# Patient Record
Sex: FEMALE | Race: WHITE
[De-identification: names, ages, dates, MRNs, and addresses within clinical notes are randomized per-mention and may not be internally consistent; named-entity substitution may affect disease eponyms.]

---

## 2018-02-10 NOTE — RAD
Indication: Chest pain.



Comparison: No relevant prior exams available on the [facility] [place], [place] PACS for comparison.



Technique: Upright AP 1235 hours



Report: Clear lungs and pleural spaces. Negative for pneumothorax. The heart, pulmonary

vasculature, and mediastinal contours are unremarkable. Unremarkable osseous structures

and soft tissue contours.



IMPRESSION: No evidence for acute intrathoracic disease.

## 2018-02-28 NOTE — ED
HPI Chest Pain





- HPI Summary


HPI Summary: 





Patient is an otherwise healthy 47-year-old female who presents to the ED with 

chief complaint of chest pain 30 minutes.  She endorses resting while first 

experiencing the chest pain.  She endorses significant anxiety over the 

episode.  Denies any cardiac history.  Denies family history.  She takes no 

medications.  She denies any shortness of breath, coughing, upper respiratory 

illness recently or other contributing factors.  Symptoms are aggravated by 

nothing and alleviated by nothing.  She is resting comfortably on arrival.  She 

does not have a history of anxiety.  She states she would just like to get "

this checked out".  She denies any recent travel, calf pain, OCP use, or 

smoking history.  Has never had a blood clot.  Pain is most notably located 

over the left anterior and does not radiate into the jaw or the arm.





- History of Current Complaint


Chief Complaint: EDChestPainROMI


Time Seen by Provider: 02/10/18 12:09


Hx Obtained From: Patient


Onset/Duration: Started Hours Ago


Timing: Constant


Initial Severity: Moderate


Current Severity: Moderate


Pain Intensity: 3


Pain Scale Used: 0-10 Numeric


Chest Pain Location: Discrete at:, Left Anterior


Aggravating Factor(s): Nothing


Alleviating Factor(s): Nothing


Associated Signs and Symptoms: Positive: Chest Pain





- Risk Factors


Pulmonary Embolism Risk Factors: Negative


TAD Risk Factors: Negative





- Allergy/Home Medications


Allergies/Adverse Reactions: 


 Allergies











Allergy/AdvReac Type Severity Reaction Status Date / Time


 


MS Pregabalin [From Lyrica] Allergy  Rash Verified 09/29/16 15:59














PMH/Surg Hx/FS Hx/Imm Hx


Previously Healthy: Yes





- Surgical History


Surgery Procedure, Year, and Place: tubal ligation





- Immunization History


Hx Pertussis Vaccination: No


Immunizations Up to Date: Unable to Obtain/Confirm


Infectious Disease History: No


Infectious Disease History: 


   Denies: History Other Infectious Disease, Traveled Outside the [place] in Last 30 

Days





- Family History


Known Family History: Positive: Blood Disorder - no immune disorders in FHX





- Social History


Occupation: Employed Full-time


Lives: With Family


Alcohol Use: Weekly


Alcohol Amount: 3 x's per week  approx


Hx Substance Use: Yes


Substance Use Type: Reports: None


Smoking Status (MU): Never Smoked Tobacco





Review of Systems


Constitutional: Negative


Negative: Fever, Chills, Fatigue, Skin Diaphoresis


Eyes: Negative


Positive: Chest Pain


Respiratory: Negative


Gastrointestinal: Negative


Positive: no symptoms reported, see HPI


Musculoskeletal: Negative


Neurological: Negative


Positive: Anxious


All Other Systems Reviewed And Are Negative: Yes





Physical Exam


Triage Information Reviewed: Yes


Vital Signs On Initial Exam: 


 Initial Vitals











Temp Pulse Resp BP Pulse Ox


 


 98.6 F   98   18   154/93   100 


 


 02/10/18 11:59  02/10/18 11:59  02/10/18 11:59  02/10/18 11:59  02/10/18 11:59











Vital Signs Reviewed: Yes


Appearance: Positive: Well-Appearing, Well-Nourished


Skin: Positive: Warm, Skin Color Reflects Adequate Perfusion


Head/Face: Positive: Normal Head/Face Inspection


Eyes: Positive: EOMI, DALIA, Conjunctiva Clear


Neck: Positive: Supple, No Lymphadenopathy


Respiratory/Lung Sounds: Positive: Clear to Auscultation, Breath Sounds Present


Cardiovascular: Positive: RRR, Pulses are Symmetrical in both Upper and Lower 

Extremities


Musculoskeletal: Positive: Normal, Strength/ROM Intact


Neurological: Positive: Speech Normal


Psychiatric: Positive: Anxious





Diagnostics





- Vital Signs


 Vital Signs











  Temp Pulse Resp BP Pulse Ox


 


 02/10/18 15:10  97.3 F  79  18  138/90  99


 


 02/10/18 15:07     138/90 


 


 02/10/18 15:00    16  


 


 02/10/18 14:30   74  22  139/96  100


 


 02/10/18 14:11   74   141/96  100


 


 02/10/18 14:00   70  18   100


 


 02/10/18 13:30   74  19  147/95  100


 


 02/10/18 13:00   73  15  149/99  98


 


 02/10/18 12:31   72  13  155/98  100


 


 02/10/18 12:28    10  


 


 02/10/18 11:59  98.6 F  98  18  154/93  100














- Laboratory


Lab Results: 


 Lab Results











  02/10/18 02/10/18 02/10/18 Range/Units





  12:50 12:50 12:50 


 


WBC    4.9  (3.5-10.8)  10^3/ul


 


RBC    4.79  (4.0-5.4)  10^6/ul


 


Hgb    14.6  (12.0-16.0)  g/dl


 


Hct    44  (35-47)  %


 


MCV    91  (80-97)  fL


 


MCH    30  (27-31)  pg


 


MCHC    34  (31-36)  g/dl


 


RDW    14  (10.5-15)  %


 


Plt Count    266  (150-450)  10^3/ul


 


MPV    9  (7.4-10.4)  um3


 


Neut % (Auto)    61.4  (38-83)  %


 


Lymph % (Auto)    26.1  (25-47)  %


 


Mono % (Auto)    9.8 H  (1-9)  %


 


Eos % (Auto)    2.3  (0-6)  %


 


Baso % (Auto)    0.4  (0-2)  %


 


Absolute Neuts (auto)    3.0  (1.5-7.7)  10^3/ul


 


Absolute Lymphs (auto)    1.3  (1.0-4.8)  10^3/ul


 


Absolute Monos (auto)    0.5  (0-0.8)  10^3/ul


 


Absolute Eos (auto)    0.1  (0-0.6)  10^3/ul


 


Absolute Basos (auto)    0  (0-0.2)  10^3/ul


 


Absolute Nucleated RBC    0  10^3/ul


 


Nucleated RBC %    0  


 


INR (Anticoag Therapy)  0.90    (0.77-1.02)  


 


APTT  25.9 L    (26.0-36.3)  seconds


 


Sodium     (133-145)  mmol/L


 


Potassium     (3.5-5.0)  mmol/L


 


Chloride     (101-111)  mmol/L


 


Carbon Dioxide     (22-32)  mmol/L


 


Anion Gap     (2-11)  mmol/L


 


BUN     (6-24)  mg/dL


 


Creatinine     (0.51-0.95)  mg/dL


 


Est GFR ( Amer)     (>60)  


 


Est GFR (Non-Af Amer)     (>60)  


 


BUN/Creatinine Ratio     (8-20)  


 


Glucose     ()  mg/dL


 


Lactic Acid     (0.5-2.0)  mmol/L


 


Calcium     (8.6-10.3)  mg/dL


 


Magnesium     (1.9-2.7)  mg/dL


 


Total Bilirubin     (0.2-1.0)  mg/dL


 


AST     (13-39)  U/L


 


ALT     (7-52)  U/L


 


Alkaline Phosphatase     ()  U/L


 


Total Creatine Kinase     ()  U/L


 


CK-MB (CK-2)     (0.6-6.3)  ng/mL


 


Myoglobin     (14.3-65.8)  ng/mL


 


Troponin I     (<0.04)  ng/mL


 


B-Natriuretic Peptide   22  ( - 100) pg/mL


 


Total Protein     (6.4-8.9)  g/dL


 


Albumin     (3.2-5.2)  g/dL


 


Globulin     (2-4)  g/dL


 


Albumin/Globulin Ratio     (1-3)  


 


TSH     (0.34-5.60)  mcIU/mL


 


Thyroxine (T4)     (6.09-12.23)  mcg/mL


 


Beta HCG, Quant     mIU/mL














  02/10/18 02/10/18 Range/Units





  12:50 12:50 


 


WBC    (3.5-10.8)  10^3/ul


 


RBC    (4.0-5.4)  10^6/ul


 


Hgb    (12.0-16.0)  g/dl


 


Hct    (35-47)  %


 


MCV    (80-97)  fL


 


MCH    (27-31)  pg


 


MCHC    (31-36)  g/dl


 


RDW    (10.5-15)  %


 


Plt Count    (150-450)  10^3/ul


 


MPV    (7.4-10.4)  um3


 


Neut % (Auto)    (38-83)  %


 


Lymph % (Auto)    (25-47)  %


 


Mono % (Auto)    (1-9)  %


 


Eos % (Auto)    (0-6)  %


 


Baso % (Auto)    (0-2)  %


 


Absolute Neuts (auto)    (1.5-7.7)  10^3/ul


 


Absolute Lymphs (auto)    (1.0-4.8)  10^3/ul


 


Absolute Monos (auto)    (0-0.8)  10^3/ul


 


Absolute Eos (auto)    (0-0.6)  10^3/ul


 


Absolute Basos (auto)    (0-0.2)  10^3/ul


 


Absolute Nucleated RBC    10^3/ul


 


Nucleated RBC %    


 


INR (Anticoag Therapy)    (0.77-1.02)  


 


APTT    (26.0-36.3)  seconds


 


Sodium  136   (133-145)  mmol/L


 


Potassium  4.0   (3.5-5.0)  mmol/L


 


Chloride  104   (101-111)  mmol/L


 


Carbon Dioxide  28   (22-32)  mmol/L


 


Anion Gap  4   (2-11)  mmol/L


 


BUN  15   (6-24)  mg/dL


 


Creatinine  0.63   (0.51-0.95)  mg/dL


 


Est GFR ( Amer)  131.4   (>60)  


 


Est GFR (Non-Af Amer)  102.2   (>60)  


 


BUN/Creatinine Ratio  23.8 H   (8-20)  


 


Glucose  100   ()  mg/dL


 


Lactic Acid   0.7  (0.5-2.0)  mmol/L


 


Calcium  9.2   (8.6-10.3)  mg/dL


 


Magnesium  2.2   (1.9-2.7)  mg/dL


 


Total Bilirubin  0.40   (0.2-1.0)  mg/dL


 


AST  19   (13-39)  U/L


 


ALT  20   (7-52)  U/L


 


Alkaline Phosphatase  79   ()  U/L


 


Total Creatine Kinase  78   ()  U/L


 


CK-MB (CK-2)  1.2   (0.6-6.3)  ng/mL


 


Myoglobin  20.4   (14.3-65.8)  ng/mL


 


Troponin I  0.00   (<0.04)  ng/mL


 


B-Natriuretic Peptide   ( - 100) pg/mL


 


Total Protein  6.7   (6.4-8.9)  g/dL


 


Albumin  4.1   (3.2-5.2)  g/dL


 


Globulin  2.6   (2-4)  g/dL


 


Albumin/Globulin Ratio  1.6   (1-3)  


 


TSH  1.60   (0.34-5.60)  mcIU/mL


 


Thyroxine (T4)  5.57 L   (6.09-12.23)  mcg/mL


 


Beta HCG, Quant  1.62   mIU/mL











Result Diagrams: 


 02/10/18 12:50





 02/10/18 12:50


Lab Statement: Any lab studies that have been ordered have been reviewed, and 

results considered in the medical decision making process.





Chest Pain Course/Dx





- Course


Course Of Treatment: During the course of treatment the patient is evaluated 

for chest pain.   She denies any chest pain currently, but experienced chest 

pain 30 minutes prior to arrival for approximately 30 minutes.  She has no 

history of cardiac events.  She takes no medications.  Has no history of 

anxiety.  She is slightly anxious during physical exam.  No acute findings on 

physical exam, lungs clear to auscultation bilaterally and regular rate and 

rhythm.  All labs obtained and within normal limits except for a slightly low 

thyroid study.  Troponin 0.00.  Chest x-ray shows no acute findings.  The 

patient is made aware of all results it is okay for discharge at this time.  

For any worsening or changing symptoms, she will return to the ED.  She will 

also follow up with her PCP this week.  I am not concerned over a MI, PE or any 

other more emergent cardiac event at this time.





- Chest Pain


Differential Diagnosis/HQI/PQRI: Angina, Chest Wall, Pulmonary Embolism





- Diagnoses


Provider Diagnoses: 


 Chest pain








Discharge





- Discharge Plan


Condition: Stable


Disposition: HOME


Patient Education Materials:  Chest Pain (ED)


Referrals: 


Lexy He MD [Primary Care Provider] - 


Additional Instructions: 


Please follow-up with your PCP


All lab work looked okay today


Your blood pressure was stable

## 2018-05-11 ENCOUNTER — HOSPITAL ENCOUNTER (EMERGENCY)
Dept: HOSPITAL 25 - ED | Age: 48
Discharge: HOME | End: 2018-05-11
Payer: COMMERCIAL

## 2018-05-11 ENCOUNTER — HOSPITAL ENCOUNTER (OUTPATIENT)
Dept: HOSPITAL 25 - OR | Age: 48
Discharge: HOME | End: 2018-05-11
Attending: PODIATRIST
Payer: COMMERCIAL

## 2018-05-11 VITALS — DIASTOLIC BLOOD PRESSURE: 111 MMHG | SYSTOLIC BLOOD PRESSURE: 170 MMHG

## 2018-05-11 VITALS — SYSTOLIC BLOOD PRESSURE: 155 MMHG | DIASTOLIC BLOOD PRESSURE: 112 MMHG

## 2018-05-11 DIAGNOSIS — M20.12: Primary | ICD-10-CM

## 2018-05-11 DIAGNOSIS — R42: ICD-10-CM

## 2018-05-11 DIAGNOSIS — Z53.9: ICD-10-CM

## 2018-05-11 DIAGNOSIS — Z87.891: ICD-10-CM

## 2018-05-11 DIAGNOSIS — I10: Primary | ICD-10-CM

## 2018-05-11 PROCEDURE — 99283 EMERGENCY DEPT VISIT LOW MDM: CPT

## 2018-05-11 NOTE — ED
Jagdeep COLLINS Natalie, scribed for Jimbo Mckeon MD on 05/11/18 at 1232 .





Hypertension





- HPI Summary


HPI Summary: 


The pt is a 46 y/o F presenting to the ED c/o dizziness and anxiety attack, 

causing a hypertensive episode this morning. She was on the operation floor for 

a bunion surgery, but her BP was too high, and she was sent to the ED before 

the surgery. She states she is more relaxed in the ED, but her BP is still 

elevated. She is not experiencing any other symptoms at the moment. During her 

pre-op visit, her first BP reading was high, and the second was normal. She 

does not usually have high blood pressure, but she has had similar episodes 

prior to hip surgery. She is not currently being treated for HTN by her PCP. 

The pt has a hx of anxiety without medication. 





- History of Current Complaint


Chief Complaint: EDHypertension


Stated Complaint: BP ISSUE


Time Seen by Provider: 05/11/18 12:09


Hx Obtained From: Patient


Onset/Duration: Started Minutes Ago, Still Present


Timing: Constant


Aggravating Factor(s): Other: - anxiety before surgery


Associated Signs & Symptoms: Anxiety/Stress, Dizziness


Related Hx: Similar Episode - prior to hip surgery and pre-op visit





- Allergies/Home Medications


Allergies/Adverse Reactions: 


 Allergies











Allergy/AdvReac Type Severity Reaction Status Date / Time


 


pregabalin [From Lyrica] Allergy  Rash Verified 05/11/18 10:39











Home Medications: 


 Home Medications





NK [No Home Medications Reported]  05/11/18 [History Confirmed 05/11/18]











PMH/Surg Hx/FS Hx/Imm Hx


Respiratory History: Reports: Hx Asthma - pneumonia recently occas wheezing


Musculoskeletal History: Reports: Hx Arthritis - osteo generalized, Other 

Musculoskeletal History - bunion on left foot


Sensory History: Reports: Hx Contacts or Glasses


   Denies: Hx Hearing Aid


Opthamlomology History: Reports: Hx Contacts or Glasses


Psychiatric History: Reports: Hx Anxiety - no  meds





- Surgical History


Surgery Procedure, Year, and Place: tubal ligation 2012.  right total hip 

replacement 2017.  cervical cerclage 01/2012


Hx Anesthesia Reactions: No


Infectious Disease History: No


Infectious Disease History: 


   Denies: History Other Infectious Disease, Traveled Outside the [place] in Last 30 

Days





- Family History


Known Family History: Positive: Blood Disorder - no immune disorders in FHX





- Social History


Alcohol Use: Weekly


Alcohol Amount: 3 x's per week  approx


Hx Substance Use: Yes


Substance Use Type: Reports: None


Smoking Status (MU): Former Smoker





Review of Systems


Neurological: Other - dizziness


Positive: Anxious


All Other Systems Reviewed And Are Negative: Yes





Physical Exam





- Summary


Physical Exam Summary: 


Appearance: The patient is well-nourished in no acute distress and in no acute 

pain.


 


Skin: The skin is warm and dry and skin color reflects adequate perfusion.


 


HEENT: The head is normocephalic and atraumatic. The pupils are equal and 

reactive. The conjunctivae are clear and without drainage. Nares are patent and 

without drainage. Mouth reveals moist mucous membranes and the throat is 

without erythema and exudate. The external ears are intact. The ear canals are 

patent and without drainage. The tympanic membranes are intact.


 


Neck: the neck is supple with full range of motion and non-tender. There are no 

carotid bruits. There is no neck vein distension.


 


Respiratory: Chest is non-tender. Lungs are clear to auscultation and breath 

sounds are symmetrical and equal.


 


Cardiovascular: Heart is regular rate and rhythm. There is no murmur or rub 

auscultated. There is no peripheral edema and pulses are symmetrical and equal.


 


Abdomen: The abdomen is soft and non-tender. There are normal bowel sounds 

heard in all four quadrants and there is no organomegaly palpated.


 


Musculoskeletal: There is no back tenderness noted. Extremities are non-tender 

with full range of motion. There is good capillary refill. There is no 

peripheral edema or calf tenderness elicited.


 


Neurological: Patient is alert and oriented to person, place and time. The 

patient has symmetrical motor strength in all four extremities. Cranial nerves 

are grossly intact. Deep tendon reflexes are symmetrical and equal in all four 

extremities.


 


Psychiatric: The patient has an appropriate affect and does not exhibit any 

anxiety or depression.


Triage Information Reviewed: Yes


Vital Signs On Initial Exam: 


 Initial Vitals











Temp Pulse Resp BP Pulse Ox


 


 98.2 F   70   16   168/116   100 


 


 05/11/18 12:04  05/11/18 12:04  05/11/18 12:04  05/11/18 12:04  05/11/18 12:04











Vital Signs Reviewed: Yes





Diagnostics





- Vital Signs


 Vital Signs











  Temp Pulse Resp BP Pulse Ox


 


 05/11/18 12:04  98.2 F  70  16  168/116  100














- Laboratory


Lab Statement: Any lab studies that have been ordered have been reviewed, and 

results considered in the medical decision making process.





Re-Evaluation





- Re-Evaluation


  ** First Eval


Re-Evaluation Time: 13:42


Change: Improved


Comment: The patient is feeling more realxed. She will be discharged. She is 

agreeable with this plan.





Hypertension Course/Dx





- Course


Course Of Treatment: Ms. Hinojosa was here for bunion surgery but her blood 

pressure was too high so she was referred down here. She felt that is was just '

white coat syndrome' and therefore I gave her an Ativan.  She got quite relaxed 

but her blood pressure didn't budge much.  Her diastolic continued to hover 

around 110 - 115.  She was completly asymptomatic though so I will refer her to 

her PCP first of the week.  She is to return if she develops any symptoms and 

agrees.





- Diagnoses


Provider Diagnoses: 


 Hypertension








Discharge





- Sign-Out/Discharge


Documenting (check all that apply): Discharge/Admit/Transfer





- Discharge Plan


Condition: Stable


Disposition: HOME


Referrals: 


Jacky Salazar MD [Medical Doctor] - 05/14/18


Additional Instructions: 


Follow up with Dr. Phoenix next week. Please return to the emergency 

department for any new or worsening symptoms.





- Billing Disposition and Condition


Condition: STABLE


Disposition: HOME





The documentation as recorded by the Jagdeep alcazar Natalie accurately reflects 

the service I personally performed and the decisions made by me, Jimbo Mckeon MD.

## 2018-06-09 ENCOUNTER — HOSPITAL ENCOUNTER (EMERGENCY)
Dept: HOSPITAL 25 - UCCORT | Age: 48
Discharge: HOME | End: 2018-06-09
Payer: COMMERCIAL

## 2018-06-09 VITALS — SYSTOLIC BLOOD PRESSURE: 165 MMHG | DIASTOLIC BLOOD PRESSURE: 105 MMHG

## 2018-06-09 DIAGNOSIS — Z87.891: ICD-10-CM

## 2018-06-09 DIAGNOSIS — Z88.8: ICD-10-CM

## 2018-06-09 DIAGNOSIS — M47.899: ICD-10-CM

## 2018-06-09 DIAGNOSIS — M62.838: Primary | ICD-10-CM

## 2018-06-09 DIAGNOSIS — R03.0: ICD-10-CM

## 2018-06-09 PROCEDURE — 72050 X-RAY EXAM NECK SPINE 4/5VWS: CPT

## 2018-06-09 PROCEDURE — 99212 OFFICE O/P EST SF 10 MIN: CPT

## 2018-06-09 PROCEDURE — 72070 X-RAY EXAM THORAC SPINE 2VWS: CPT

## 2018-06-09 PROCEDURE — 86618 LYME DISEASE ANTIBODY: CPT

## 2018-06-09 PROCEDURE — 36415 COLL VENOUS BLD VENIPUNCTURE: CPT

## 2018-06-09 PROCEDURE — G0463 HOSPITAL OUTPT CLINIC VISIT: HCPCS

## 2018-06-09 PROCEDURE — 86140 C-REACTIVE PROTEIN: CPT

## 2018-06-09 NOTE — UC
Neck Pain HPI





- HPI Summary


HPI Summary: 


Patient comes to the urgent care today with her .  Patient complains of 

6 days of worsening neck pain and musculoskeletal tightness.  She has had an 

MRI and MRA lumbar puncture and CT scan in the past 4 days of which are 

negative she was discharged from [facility] and was told to follow up 

neurology which she has an appointment for the middle of July and with her 

primary care doctor who she's hoping to see this week. 





 Patient has a history of some back pain years ago, she had some lower back 

pain was treated with steroid injections at the pain clinic in [place] and no 

further patient reported instances.  she has a history of some anxiety she was 

treated for 3 weeks with Zoloft 25 mg did not report back to her primary care 

doctor that it was not helping and getting dose increased.  Patient also 

reports that she is only sleeping 4-6 hours a night and a broken fitful sleep.  

Patient does endorse anxiety, but denies suicidal or homicidal ideation.








- History of Current Complaint


Hx Obtained From: Patient


Hx Last Menstrual Period: 18


Pregnant?: No


Onset/Duration Of Injury/Symptoms: Days - 6-7


Timing: Constant


Onset/Duration: Sudden Onset


Pain Intensity: 7


Pain Scale Used: 0-10 Numeric


Location: Diffuse - left trapezious muscle


Character: Stiff, Spasmotic


Alleviating Factors: Nothing


Associated Signs & Symptoms: Positive: Paresthesia - tingling in left had after 

papation of neck and shoulder





<Brittanie Bravo - Last Filed: 18 17:44>





<Zachery Bradford - Last Filed: 18 18:02>





- History of Current Complaint


Chief Complaint: UCHeadache


Stated Complaint: HEADACHE,NECK PAIN


Time Seen by Provider: 18 15:48





- Allergies/Home Medications


Allergies/Adverse Reactions: 


 Allergies











Allergy/AdvReac Type Severity Reaction Status Date / Time


 


pregabalin [From Lyrica] Allergy  Rash Verified 18 15:55











Home Medications: 


 Home Medications





Acetaminophen [Acetaminophen Extra Strength] 500 mg PO Q6H PRN 18 [

History Confirmed 18]











PMH/Surg Hx/FS Hx/Imm Hx


Previously Healthy: No


Psychological History: Anxiety - untreated


Other Psychological History: untreated anxiety and insomnia





- Surgical History


Surgical History: Yes


Surgery Procedure, Year, and Place: tubal ligation .  right total hip 

replacement 2017.  cervical cerclage 2012





- Family History


Known Family History: Positive: Blood Disorder - no immune disorders in FHX





- Social History


Occupation: Works From/At Home


Lives: With Family


Alcohol Use: Occasionally


Alcohol Amount: 3 x's per week  approx


Substance Use Type: None


Smoking Status (MU): Former Smoker


When Did the Patient Quit Smoking/Using Tobacco: 





<Brittanie Bravo - Last Filed: 18 17:44>





Review Of Systems


Constitutional: Positive: Negative


Skin: Positive: Negative


Eyes: Positive: Negative


ENT: Positive: Negative


Respiratory: Positive: Negative


Cardiovascular: Positive: Negative


Gastrointestinal: Positive: Negative


Genitourinary: Positive: Negative


Musculoskeletal: Positive: Myalgia


Neurological: Positive: Paresthesia - left hand with muscle examination in left 

shoulder


Psychological: Positive: Negative


All Other Systems Reviewed And Are Negative: No





<Brittanie Bravo - Last Filed: 18 17:44>





Physical Exam


Triage Information Reviewed: Yes


Appearance: Well-Appearing, Well-Nourished, Pain Distress


Vital Signs: 


 Initial Vital Signs











Temp  98.7 F   18 15:42


 


Pulse  105   18 15:42


 


Resp  18   18 15:42


 


BP  165/105   18 15:42


 


Pulse Ox  100   18 15:42











Vital Signs Reviewed: Yes


Eye Exam: Normal


Eyes: Positive: Conjunctiva Clear


ENT Exam: Normal


ENT: Positive: Normal ENT inspection, Hearing grossly normal, Pharynx normal, 

TMs normal, Uvula midline.  Negative: Nasal congestion, Nasal drainage, Trismus

, Muffled voice, Hoarse voice, Dental tenderness, Sinus tenderness


Dental Exam: Normal


Neck exam: Normal


Neck: Positive: Supple, No Lymphadenopathy, Tenderness @ - left side of neck


Respiratory Exam: Normal


Respiratory: Positive: Chest non-tender, Lungs clear, Normal breath sounds, No 

respiratory distress, No accessory muscle use


Cardiovascular Exam: Normal


Cardiovascular: Positive: RRR, No Murmur, Pulses Normal, Brisk Capillary Refill


Musculoskeletal Exam: Other


Musculoskeletal: Positive: ROM Intact, No Edema, Strength Limited @ - in left 

arm less than right


Neurological Exam: Normal


Neurological: Positive: Alert, Muscle Tone Normal


Psychological Exam: Normal


Skin Exam: Normal





<Brittanie Bravo - Last Filed: 18 17:44>


Vital Signs: 


 Initial Vital Signs











Temp  98.7 F   18 15:42


 


Pulse  105   18 15:42


 


Resp  18   18 15:42


 


BP  165/105   18 15:42


 


Pulse Ox  100   18 15:42














<Zachery Bradford - Last Filed: 18 18:02>





Diagnostics





- Radiology


  ** No standard instances


Xray Interpretation: Positive (See Comments)


Radiology Interpretation Completed By: Radiologist - Patient Name:         BRET PLATA                                                                   

Medical Record#: [id number] Ordering Physician: Brittanie Bravo NP             

                                                    Acct.#: [id number] :  

   1970         Age: 48   Sex: F                                         

                  Location: URGENT [facility] Exam Date: 18 1630    

                                                                           ADM 

Status: REG ER Order Information:                         THORACIC SPINE 2 VWS 

Accession Number:                          [id number] CPT:                    

                   14348 Indication: Neck and upper back pain. Recent 

pneumonia.  Comparison: No relevant prior exams available on the [facility] PACS for 

comparison.  Technique: AP and lateral views thoracic spine.   Report: Normal 

thoracic kyphosis. Negative for fracture or suspicious focal osseous lesions. 

Multilevel mild disc space narrowing at the mid thoracic spine. Minimal 

associated vertebral endplate osteophytosis. Unremarkable paraspinal soft 

tissue contours.  IMPRESSION:  Mild degenerative spondylosis.  _________________

___________________________________________ <Electronically signed by Emanuel Monsalve MD in OV>  18 Dictated By: Emanuel Monsalve MD Dictated Date/

Time: 18 Transcribed Date/Time: 18 Copy to:    CC:

Lexy He MD; Brittanie Bravo NP; Zachery Bradford MD Imaging - [facility]                                 Imaging - [facility]  [address]       

                                [address] [address] ph [phone])                                     ph [phone])                                                ph [phone])    

                                                                               

  1 of   Patient Name:         BRET PLATA                                 

                                  Medical Record#: [id number] Ordering Physician

: Brittanie Bravo NP                                                           

      Acct.#: [id number] :     1970         Age: 48   Sex: F        

                                                   Location: URGENT [facility] Exam Date: 18 1630                                              

                                 ADM Status: REG ER Order Information:         

                SP CERVICAL 4+VWS Accession Number:                          

[id number] CPT:                                       08899 Indication: Neck 

pain. Decreased range of motion. Recent pneumonia.  Comparison: No relevant 

prior exams available on the [facility] PACS for comparison.  Technique: AP, open-

mouth odontoid, lateral, and oblique views cervical spine.  Report: 

Straightening relative to normal cervical lordosis without facet subluxation at 

any level. Negative for fracture or conspicuous focal osseous lesions. 

Multilevel mild vertebral endplate osteophytosis. Mild disc space narrowing at 

C5-C6 and C6-C7. The oblique views are negative for osseous foraminal stenosis. 

Unremarkable prevertebral soft tissue contours.  IMPRESSION:  Multilevel mild 

degenerative spondylosis.  _____________________________________________________

_______ <Electronically signed by Emanuel Monsalve MD in OV>  18 

Dictated By: Emanuel Monsalve MD Dictated Date/Time: 18 Transcribed 

Date/Time: 18 Copy to:    CC:Lexy He MD; Brittanie Bravo NP; Zachery Bradford MD Imaging - [facility]                         

        Imaging - [place] [facility]  [address]                                

       [address] [place] [address] ph [phone])                                     ph [phone])               

                                 ph [phone])                             

                                                      1 of 





<Brittaine Bravo - Last Filed: 18 17:44>





Neck Pain Course/Dx





- Course


Course Of Treatment: medrol dose pack, soma, lab studies for LYME, follow bp 

and symptoms with pcp





- Differential Dx/Diagnosis


Provider Diagnoses: elevated blood pressure with out dx of hypertension, muscle 

spasm, mild multilevel degenerative spine disorder





<Brittanie Bravo - Last Filed: 18 17:44>





Discharge





- Sign-Out/Discharge


Documenting (check all that apply): Discharge/Admit/Transfer





- Billing Disposition and Condition


Condition: STABLE


Disposition: Home





<Brittanie Bravo - Last Filed: 18 17:44>





- Billing Disposition and Condition


Condition: STABLE


Disposition: Home





<Zachery Bradford - Last Filed: 18 18:02>





- Discharge Plan


Condition: Stable


Disposition: HOME


Prescriptions: 


Carisoprodol [Soma] 350 mg PO TID PRN #15 tablet MDD 3


 PRN Reason: muscle spasm


methylPREDNISolone [Medrol] 4 mg PO .SEE COOKIE INSTRUCTION #1 packet


Patient Education Materials:  Lyme Disease (ED), Hypertension (ED), Muscle 

Spasm (ED), Insomnia (ED), Acute Neck Pain (ED)


Referrals: 


Lexy He MD [Primary Care Provider] - 1 Week


Additional Instructions: 


Per institutional requirements, I have reviewed the chart, however, I was not 

consulted specifically or made aware of this patient by the above midlevel 

provider.  I did not personally evaluate, interact with , or disposition  this 

patient.

## 2018-06-09 NOTE — RAD
Indication: Neck pain. Decreased range of motion. Recent pneumonia.



Comparison: No relevant prior exams available on the [facility] PACS for comparison.



Technique: AP, open-mouth odontoid, lateral, and oblique views cervical spine.



Report: Straightening relative to normal cervical lordosis without facet subluxation at

any level. Negative for fracture or conspicuous focal osseous lesions. Multilevel mild

vertebral endplate osteophytosis. Mild disc space narrowing at C5-C6 and C6-C7. The

oblique views are negative for osseous foraminal stenosis. Unremarkable prevertebral soft

tissue contours.



IMPRESSION:  Multilevel mild degenerative spondylosis.

## 2018-06-09 NOTE — RAD
Indication: Neck and upper back pain. Recent pneumonia.



Comparison: No relevant prior exams available on the [facility] PACS for comparison.



Technique: AP and lateral views thoracic spine. 



Report: Normal thoracic kyphosis. Negative for fracture or suspicious focal osseous

lesions. Multilevel mild disc space narrowing at the mid thoracic spine. Minimal

associated vertebral endplate osteophytosis. Unremarkable paraspinal soft tissue contours.



IMPRESSION:  Mild degenerative spondylosis.

## 2018-07-10 ENCOUNTER — HOSPITAL ENCOUNTER (EMERGENCY)
Dept: HOSPITAL 25 - UCCORT | Age: 48
Discharge: HOME | End: 2018-07-10
Payer: COMMERCIAL

## 2018-07-10 VITALS — DIASTOLIC BLOOD PRESSURE: 94 MMHG | SYSTOLIC BLOOD PRESSURE: 118 MMHG

## 2018-07-10 DIAGNOSIS — Z88.8: ICD-10-CM

## 2018-07-10 DIAGNOSIS — Z87.891: ICD-10-CM

## 2018-07-10 DIAGNOSIS — R05: ICD-10-CM

## 2018-07-10 DIAGNOSIS — Z96.641: ICD-10-CM

## 2018-07-10 DIAGNOSIS — Z83.2: ICD-10-CM

## 2018-07-10 DIAGNOSIS — J45.909: Primary | ICD-10-CM

## 2018-07-10 PROCEDURE — 99212 OFFICE O/P EST SF 10 MIN: CPT

## 2018-07-10 PROCEDURE — G0463 HOSPITAL OUTPT CLINIC VISIT: HCPCS

## 2018-07-10 NOTE — UC
General HPI





- HPI Summary


HPI Summary: 





Patient is complaining of 3 week history of cough and wheezing.  She admits to 

some shortness of breath as well as a history of asthma.  She denies any fever.

  She also notes that she has sneezing and itchy watery eyes.  She offers that 

she has a history of allergies but instead no self treatment.





- History of Current Complaint


Chief Complaint: UCRespiratory


Stated Complaint: COUGH


Time Seen by Provider: 07/10/18 13:45


Hx Obtained From: Patient


Hx Last Menstrual Period: 07/10/18


Onset/Duration: Gradual Onset


Timing: Constant


Pain Intensity: 3


Associated Signs & Symptoms: Positive: Cough, SOB, Wheezing.  Negative: Chest 

Pain, Fever





- Allergy/Home Medications


Allergies/Adverse Reactions: 


 Allergies











Allergy/AdvReac Type Severity Reaction Status Date / Time


 


pregabalin [From Lyrica] Allergy  Rash Verified 06/09/18 15:55











Home Medications: 


 Home Medications





Dm/Acetaminophen/Doxylamine [Night Cold-Flu Relief Liq Gel] 1 each PO DAILY 07/

10/18 [History Confirmed 07/10/18]











PMH/Surg Hx/FS Hx/Imm Hx





- Additional Past Medical History


Additional PMH: 





ALLERGIES


Respiratory History: Asthma





- Surgical History


Surgical History: Yes


Surgery Procedure, Year, and Place: tubal ligation 2012.  right total hip 

replacement 2017.  cervical cerclage 01/2012





- Family History


Known Family History: Positive: Blood Disorder - no immune disorders in FHX





- Social History


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


Lives: With Family


Alcohol Use: Occasionally


Alcohol Amount: 3 x's per week  approx


Substance Use Type: None


Smoking Status (MU): Former Smoker


When Did the Patient Quit Smoking/Using Tobacco: 2000s


Household Exposure Type: Cigarettes





- Immunization History


Vaccination Up to Date: Yes





Review of Systems


Constitutional: Negative


Skin: Negative


Eyes: Negative


ENT: Negative


Respiratory: Shortness Of Breath, Cough


Cardiovascular: Negative


Gastrointestinal: Negative


Genitourinary: Negative


Motor: Negative


Neurovascular: Negative


Musculoskeletal: Negative


Neurological: Negative


Psychological: Negative


Is Patient Immunocompromised?: No


All Other Systems Reviewed And Are Negative: Yes





Physical Exam


Triage Information Reviewed: Yes


Appearance: Well-Appearing, Well-Nourished


Vital Signs: 


 Initial Vital Signs











Temp  98.0 F   07/10/18 13:33


 


Pulse  90   07/10/18 13:33


 


Resp  17   07/10/18 13:33


 


BP  118/94   07/10/18 13:33


 


Pulse Ox  97   07/10/18 13:33











Vital Signs Reviewed: Yes


Eyes: Positive: Conjunctiva Clear


ENT: Positive: Pharynx normal, TMs normal.  Negative: Nasal congestion, Nasal 

drainage


Neck: Positive: Supple, Nontender, No Lymphadenopathy


Respiratory: Positive: No respiratory distress, Decreased breath sounds, 

Wheezing - occasional


Cardiovascular: Positive: RRR, No Murmur


Abdomen Description: Positive: Nontender, No Organomegaly, Bruit


Bowel Sounds: Positive: Present


Musculoskeletal: Positive: ROM Intact


Neurological: Positive: Alert


Psychological: Positive: Age Appropriate Behavior


Skin Exam: Normal





Course/Dx





- Course


Course Of Treatment: no concern for infection c/w asthma and allergies.





- Differential Dx - Multi-Symptom


Provider Diagnoses: allergies. asthma flare.





Discharge





- Sign-Out/Discharge


Documenting (check all that apply): Discharge/Admit/Transfer





- Discharge Plan


Condition: Stable


Disposition: HOME


Prescriptions: 


Albuterol HFA INHALER* [Ventolin HFA Inhaler*] 2 puff INH Q6H #1 mdi


predniSONE TAB* [Deltasone 20 MG TAB*] 40 mg PO DAILY 5 Days #10 tab


Patient Education Materials:  Asthma (DC), Allergies (ED)


Referrals: 


Lexy He MD [Primary Care Provider] - 6 Days


Additional Instructions: 


START ZYRTEC OVER THE COUNTER PER LABEL





- Billing Disposition and Condition


Condition: STABLE


Disposition: Home

## 2018-11-06 ENCOUNTER — HOSPITAL ENCOUNTER (EMERGENCY)
Dept: HOSPITAL 25 - UCCORT | Age: 48
Discharge: HOME | End: 2018-11-06
Payer: COMMERCIAL

## 2018-11-06 VITALS — SYSTOLIC BLOOD PRESSURE: 170 MMHG | DIASTOLIC BLOOD PRESSURE: 112 MMHG
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
## 2018-11-06 DIAGNOSIS — Z87.891: ICD-10-CM

## 2018-11-06 DIAGNOSIS — S05.01XA: Primary | ICD-10-CM
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
## 2018-11-06 PROCEDURE — 99212 OFFICE O/P EST SF 10 MIN: CPT

## 2018-11-06 PROCEDURE — G0463 HOSPITAL OUTPT CLINIC VISIT: HCPCS

## 2018-11-06 NOTE — UC
Eye Complaint HPI





- HPI Summary


HPI Summary: 





right eye pain and tearing x 1 day


foreign body sensation / eyelash in her right eye 


mild right eye pain and redness of right eye , no eye discharge, no change in 

vision 





- History of Current Complaint


Chief Complaint: UCEye


Stated Complaint: RT EYE PAIN


Time Seen by Provider: 11/06/18 08:43


Hx Obtained From: Patient


Hx Last Menstrual Period: 07/10/18


Pregnant?: No


Onset/Duration: Gradual Onset, Lasting Days - 1, Still Present


Timing: Constant


Severity Initially: Moderate


Severity Currently: Moderate


Pain Intensity: 3


Location of Injury: Conjunctiva - right


Character: Dull, Foreign Body Sensation


Aggravating Factor(s): Blinking


Alleviating Factor(s): Nothing


Associated Signs And Symptoms: Positive: Drainage (Clear) - right eye.  Negative

: Photophobia, Drainage (Purulent), Vision Impairment Bilateral, Vision 

Impairment Right, Vision Impairment Left, Fever, Swelling





- Allergies/Home Medications


Allergies/Adverse Reactions: 


 Allergies











Allergy/AdvReac Type Severity Reaction Status Date / Time


 


pregabalin [From Lyrica] Allergy  Rash Verified 11/06/18 08:33














PMH/Surg Hx/FS Hx/Imm Hx


Previously Healthy: Yes





- Surgical History


Surgical History: Yes


Surgery Procedure, Year, and Place: tubal ligation 2012.  right total hip 

replacement 2017.  cervical cerclage 01/2012





- Family History


Known Family History: Positive: Blood Disorder - no immune disorders in FHX





- Social History


Alcohol Use: Occasionally


Alcohol Amount: 3 x's per week  approx


Substance Use Type: None


Smoking Status (MU): Former Smoker


When Did the Patient Quit Smoking/Using Tobacco: 2000s


Household Exposure Type: Cigarettes





- Immunization History


Vaccination Up to Date: Yes





Review of Systems


Constitutional: Negative


Skin: Negative


Eyes: Eye Redness - right eye


ENT: Negative


Cardiovascular: Negative


Gastrointestinal: Negative


Genitourinary: Negative


Is Patient Immunocompromised?: No


All Other Systems Reviewed And Are Negative: Yes





Physical Exam


Triage Information Reviewed: Yes


Appearance: Well-Appearing, No Pain Distress, Well-Nourished


Vital Signs: 


 Initial Vital Signs











Temp  98 F   11/06/18 08:33


 


Pulse  108   11/06/18 08:33


 


Resp  18   11/06/18 08:33


 


BP  170/112   11/06/18 08:33


 


Pulse Ox  100   11/06/18 08:33











Vital Signs Reviewed: Yes


Eye Exam: Normal


Eyes: Positive: Conjunctiva Inflamed - right eye, Other: - minimal corneal 

abrasion right eye


ENT: Positive: Normal ENT inspection, Hearing grossly normal, Pharynx normal


Neck exam: Normal


Neck: Positive: Supple, Nontender


Respiratory: Positive: Chest non-tender, Lungs clear, Normal breath sounds, No 

respiratory distress


Cardiovascular: Positive: RRR, No Murmur, Pulses Normal


Skin Exam: Normal





Eye Complaint Course/Dx





- Differential Dx/Diagnosis


Provider Diagnoses: corneal abrasion right eye





Discharge





- Sign-Out/Discharge


Documenting (check all that apply): Patient Departure


All imaging exams completed and their final reports reviewed: No Studies





- Discharge Plan


Condition: Stable


Disposition: HOME


Patient Education Materials:  Corneal Abrasion (ED)


Referrals: 


Lexy He MD [Primary Care Provider] - If Needed





- Billing Disposition and Condition


Condition: STABLE


Disposition: Home

## 2019-01-23 ENCOUNTER — HOSPITAL ENCOUNTER (EMERGENCY)
Dept: HOSPITAL 25 - UCCORT | Age: 49
Discharge: HOME | End: 2019-01-23
Payer: SELF-PAY

## 2019-01-23 VITALS — SYSTOLIC BLOOD PRESSURE: 151 MMHG | DIASTOLIC BLOOD PRESSURE: 104 MMHG

## 2019-01-23 DIAGNOSIS — Z88.8: ICD-10-CM

## 2019-01-23 DIAGNOSIS — G47.9: Primary | ICD-10-CM

## 2019-01-23 DIAGNOSIS — Z96.641: ICD-10-CM

## 2019-01-23 DIAGNOSIS — F41.1: ICD-10-CM

## 2019-01-23 DIAGNOSIS — R00.0: ICD-10-CM

## 2019-01-23 DIAGNOSIS — F32.9: ICD-10-CM

## 2019-01-23 DIAGNOSIS — Z87.891: ICD-10-CM

## 2019-01-23 PROCEDURE — 99212 OFFICE O/P EST SF 10 MIN: CPT

## 2019-01-23 PROCEDURE — G0463 HOSPITAL OUTPT CLINIC VISIT: HCPCS

## 2019-01-23 NOTE — UC
Psychiatric Complaint HPI





- HPI Summary


HPI Summary: 





Pt c/o generalized anxiety and chronic insomnia. Pt states that she has 

difficulty sleeping and has not slept in days.  She has a hx of anxiety and 

depression.  Was given zoloft but only took it for 2 weeks and did not follow 

up with her PCP. Pt is requesting "something to allow her to sleep". Denies 

thoughts of hurting self or others. 





- History Of Current Complaint


Chief Complaint: UCGeneralIllness


Stated Complaint: ANXIETY/NOT SLEEPING


Time Seen by Provider: 01/23/19 15:15


Hx Obtained From: Patient


Hx Last Menstrual Period: 2 months ago; hx tubal ligation


Pregnant?: No


Onset/Duration: Gradual Onset, Lasting Weeks, Still Present


Timing: Constant - worse at night


Severity Initially: Mild


Severity Currently: Moderate


Character: Anxious


Aggravating Factor(s): Medication Non-compliance


Alleviating Factor(s): Other - has not been compliant with medications


Associated Signs And Symptoms: Sleep Disturbance





- Risk Factor(s)


Completed Suicide Risk Factors: Negative





- Allergies/Home Medications


Allergies/Adverse Reactions: 


 Allergies











Allergy/AdvReac Type Severity Reaction Status Date / Time


 


pregabalin [From Lyrica] Allergy  Rash Verified 01/23/19 15:25














PMH/Surg Hx/FS Hx/Imm Hx


Previously Healthy: Yes


Psychological History: Anxiety, Depression





- Surgical History


Surgical History: Yes


Surgery Procedure, Year, and Place: tubal ligation 2012.  right total hip 

replacement 2017.  cervical cerclage 01/2012





- Family History


Known Family History: Positive: Blood Disorder - no immune disorders in FHX





- Social History


Occupation: Employed Full-time


Lives: With Family


Alcohol Use: Weekly


Alcohol Amount: 1 per week approx


Substance Use Type: None


Smoking Status (MU): Former Smoker


Have You Smoked in the Last Year: No


When Did the Patient Quit Smoking/Using Tobacco: 2000s


Household Exposure Type: Cigarettes





- Immunization History


Vaccination Up to Date: Yes





Review of Systems


All Other Systems Reviewed And Are Negative: Yes


Constitutional: Positive: Negative


Skin: Positive: Negative


Eyes: Positive: Negative


ENT: Positive: Negative


Respiratory: Positive: Negative


Cardiovascular: Positive: Negative


Gastrointestinal: Positive: Negative


Genitourinary: Positive: Negative


Motor: Positive: Negative


Neurovascular: Positive: Negative


Musculoskeletal: Positive: Negative


Neurological: Positive: Negative


Psychological: Positive: Anxious


Is Patient Immunocompromised?: No





Physical Exam


Triage Information Reviewed: Yes


Appearance: Well-Appearing


Vital Signs: 


 Initial Vital Signs











Temp  98.7 F   01/23/19 15:25


 


Pulse  104   01/23/19 15:25


 


Resp  16   01/23/19 15:25


 


BP  151/104   01/23/19 15:25


 


Pulse Ox  100   01/23/19 15:25











Vital Signs Reviewed: Yes


Eye Exam: Normal


ENT Exam: Normal


Dental Exam: Normal


Neck exam: Normal


Respiratory Exam: Normal


Cardiovascular: Positive: Tachycardia


Musculoskeletal Exam: Normal


Neurological Exam: Normal


Neurological: Positive: Alert


Psychological Exam: Normal


Skin Exam: Normal





Psych Complaint Course/Dx





- Course


Course Of Treatment: I discussed wiht the pt the need to follow upwith her PCP 

for longterm management of her stated anxiety and depression.  Pt verbalized 

understanding and agreed to plan of care.





- Differential Dx/Diagnosis


Differential Diagnosis/HQI/PQRI: Anxiety, Depression


Provider Diagnosis: 


 Anxiety, Sleep disturbance, unspecified








Discharge





- Sign-Out/Discharge


Documenting (check all that apply): Patient Departure


All imaging exams completed and their final reports reviewed: No Studies





- Discharge Plan


Condition: Stable


Disposition: HOME


Prescriptions: 


LORazepam TAB(*) [Ativan 0.5 MG TAB (*)] 0.5 mg PO BEDTIME PRN #6 tab MDD 1


 PRN Reason: Anxiety


Patient Education Materials:  Generalized Anxiety Disorder (ED), Insomnia (ED)


Referrals: 


Lexy He MD [Primary Care Provider] - As Soon As Possible


Additional Instructions: 


PLEASE FOLLLOW UP IMMEDIATELY WITH YOUR PCP.  IF YOUR SYMPTOMS WORSEN, PLEASE 

SEEK CARE AT THE CLOSEST EMERGENCY ROOM. 





- Billing Disposition and Condition


Condition: STABLE


Disposition: Home